# Patient Record
Sex: MALE | Race: WHITE | NOT HISPANIC OR LATINO | Employment: FULL TIME | URBAN - METROPOLITAN AREA
[De-identification: names, ages, dates, MRNs, and addresses within clinical notes are randomized per-mention and may not be internally consistent; named-entity substitution may affect disease eponyms.]

---

## 2023-07-10 ENCOUNTER — HOSPITAL ENCOUNTER (EMERGENCY)
Facility: HOSPITAL | Age: 48
Discharge: HOME | End: 2023-07-11
Attending: EMERGENCY MEDICINE
Payer: COMMERCIAL

## 2023-07-10 DIAGNOSIS — R10.13 EPIGASTRIC ABDOMINAL PAIN: ICD-10-CM

## 2023-07-10 DIAGNOSIS — I48.91 ATRIAL FIBRILLATION WITH RAPID VENTRICULAR RESPONSE: Primary | ICD-10-CM

## 2023-07-10 LAB
ALBUMIN SERPL BCP-MCNC: 4.4 G/DL (ref 3.5–5.2)
ALP SERPL-CCNC: 155 U/L (ref 55–135)
ALT SERPL W/O P-5'-P-CCNC: 165 U/L (ref 10–44)
ANION GAP SERPL CALC-SCNC: 7 MMOL/L (ref 8–16)
AST SERPL-CCNC: 74 U/L (ref 10–40)
BASOPHILS # BLD AUTO: 0.03 K/UL (ref 0–0.2)
BASOPHILS NFR BLD: 0.3 % (ref 0–1.9)
BILIRUB SERPL-MCNC: 0.7 MG/DL (ref 0.1–1)
BILIRUB UR QL STRIP: NEGATIVE
BNP SERPL-MCNC: 37 PG/ML (ref 0–99)
BUN SERPL-MCNC: 10 MG/DL (ref 6–20)
CALCIUM SERPL-MCNC: 9.3 MG/DL (ref 8.7–10.5)
CHLORIDE SERPL-SCNC: 105 MMOL/L (ref 95–110)
CLARITY UR: CLEAR
CO2 SERPL-SCNC: 27 MMOL/L (ref 23–29)
COLOR UR: YELLOW
CREAT SERPL-MCNC: 0.7 MG/DL (ref 0.5–1.4)
DIFFERENTIAL METHOD: ABNORMAL
EOSINOPHIL # BLD AUTO: 0.1 K/UL (ref 0–0.5)
EOSINOPHIL NFR BLD: 0.7 % (ref 0–8)
ERYTHROCYTE [DISTWIDTH] IN BLOOD BY AUTOMATED COUNT: 13.6 % (ref 11.5–14.5)
EST. GFR  (NO RACE VARIABLE): >60 ML/MIN/1.73 M^2
GLUCOSE SERPL-MCNC: 104 MG/DL (ref 70–110)
GLUCOSE UR QL STRIP: NEGATIVE
HCT VFR BLD AUTO: 43.8 % (ref 40–54)
HGB BLD-MCNC: 14.3 G/DL (ref 14–18)
HGB UR QL STRIP: NEGATIVE
IMM GRANULOCYTES # BLD AUTO: 0.03 K/UL (ref 0–0.04)
IMM GRANULOCYTES NFR BLD AUTO: 0.3 % (ref 0–0.5)
KETONES UR QL STRIP: ABNORMAL
LEUKOCYTE ESTERASE UR QL STRIP: NEGATIVE
LIPASE SERPL-CCNC: 46 U/L (ref 4–60)
LYMPHOCYTES # BLD AUTO: 1.5 K/UL (ref 1–4.8)
LYMPHOCYTES NFR BLD: 16.9 % (ref 18–48)
MCH RBC QN AUTO: 30.9 PG (ref 27–31)
MCHC RBC AUTO-ENTMCNC: 32.6 G/DL (ref 32–36)
MCV RBC AUTO: 95 FL (ref 82–98)
MONOCYTES # BLD AUTO: 0.5 K/UL (ref 0.3–1)
MONOCYTES NFR BLD: 5.8 % (ref 4–15)
NEUTROPHILS # BLD AUTO: 6.8 K/UL (ref 1.8–7.7)
NEUTROPHILS NFR BLD: 76 % (ref 38–73)
NITRITE UR QL STRIP: NEGATIVE
NRBC BLD-RTO: 0 /100 WBC
PH UR STRIP: 7 [PH] (ref 5–8)
PLATELET # BLD AUTO: 301 K/UL (ref 150–450)
PMV BLD AUTO: 9.5 FL (ref 9.2–12.9)
POTASSIUM SERPL-SCNC: 3.5 MMOL/L (ref 3.5–5.1)
PROT SERPL-MCNC: 7.8 G/DL (ref 6–8.4)
PROT UR QL STRIP: ABNORMAL
RBC # BLD AUTO: 4.63 M/UL (ref 4.6–6.2)
SODIUM SERPL-SCNC: 139 MMOL/L (ref 136–145)
SP GR UR STRIP: 1.02 (ref 1–1.03)
TROPONIN I SERPL HS-MCNC: 4.2 PG/ML (ref 0–14.9)
URN SPEC COLLECT METH UR: ABNORMAL
UROBILINOGEN UR STRIP-ACNC: NEGATIVE EU/DL
WBC # BLD AUTO: 8.99 K/UL (ref 3.9–12.7)

## 2023-07-10 PROCEDURE — 96374 THER/PROPH/DIAG INJ IV PUSH: CPT | Mod: 59

## 2023-07-10 PROCEDURE — 25000003 PHARM REV CODE 250

## 2023-07-10 PROCEDURE — C9113 INJ PANTOPRAZOLE SODIUM, VIA: HCPCS | Performed by: STUDENT IN AN ORGANIZED HEALTH CARE EDUCATION/TRAINING PROGRAM

## 2023-07-10 PROCEDURE — 25500020 PHARM REV CODE 255: Performed by: EMERGENCY MEDICINE

## 2023-07-10 PROCEDURE — 63600175 PHARM REV CODE 636 W HCPCS: Performed by: STUDENT IN AN ORGANIZED HEALTH CARE EDUCATION/TRAINING PROGRAM

## 2023-07-10 PROCEDURE — 96361 HYDRATE IV INFUSION ADD-ON: CPT

## 2023-07-10 PROCEDURE — 93010 EKG 12-LEAD: ICD-10-PCS | Mod: ,,, | Performed by: INTERNAL MEDICINE

## 2023-07-10 PROCEDURE — 84484 ASSAY OF TROPONIN QUANT: CPT | Performed by: EMERGENCY MEDICINE

## 2023-07-10 PROCEDURE — 36415 COLL VENOUS BLD VENIPUNCTURE: CPT

## 2023-07-10 PROCEDURE — 85025 COMPLETE CBC W/AUTO DIFF WBC: CPT

## 2023-07-10 PROCEDURE — 99285 EMERGENCY DEPT VISIT HI MDM: CPT | Mod: 25

## 2023-07-10 PROCEDURE — 36415 COLL VENOUS BLD VENIPUNCTURE: CPT | Performed by: EMERGENCY MEDICINE

## 2023-07-10 PROCEDURE — 80053 COMPREHEN METABOLIC PANEL: CPT

## 2023-07-10 PROCEDURE — 93005 ELECTROCARDIOGRAM TRACING: CPT | Performed by: INTERNAL MEDICINE

## 2023-07-10 PROCEDURE — 83690 ASSAY OF LIPASE: CPT

## 2023-07-10 PROCEDURE — 81003 URINALYSIS AUTO W/O SCOPE: CPT

## 2023-07-10 PROCEDURE — 83880 ASSAY OF NATRIURETIC PEPTIDE: CPT

## 2023-07-10 PROCEDURE — 93010 ELECTROCARDIOGRAM REPORT: CPT | Mod: ,,, | Performed by: INTERNAL MEDICINE

## 2023-07-10 PROCEDURE — 96375 TX/PRO/DX INJ NEW DRUG ADDON: CPT

## 2023-07-10 RX ORDER — OXYCODONE AND ACETAMINOPHEN 7.5; 325 MG/1; MG/1
1 TABLET ORAL EVERY 4 HOURS PRN
Status: DISCONTINUED | OUTPATIENT
Start: 2023-07-11 | End: 2023-07-11 | Stop reason: HOSPADM

## 2023-07-10 RX ORDER — MAG HYDROX/ALUMINUM HYD/SIMETH 200-200-20
30 SUSPENSION, ORAL (FINAL DOSE FORM) ORAL ONCE
Status: COMPLETED | OUTPATIENT
Start: 2023-07-10 | End: 2023-07-10

## 2023-07-10 RX ORDER — ONDANSETRON 2 MG/ML
4 INJECTION INTRAMUSCULAR; INTRAVENOUS
Status: COMPLETED | OUTPATIENT
Start: 2023-07-10 | End: 2023-07-10

## 2023-07-10 RX ORDER — LIDOCAINE HYDROCHLORIDE 20 MG/ML
15 SOLUTION OROPHARYNGEAL ONCE
Status: COMPLETED | OUTPATIENT
Start: 2023-07-10 | End: 2023-07-10

## 2023-07-10 RX ORDER — PANTOPRAZOLE SODIUM 40 MG/10ML
40 INJECTION, POWDER, LYOPHILIZED, FOR SOLUTION INTRAVENOUS
Status: COMPLETED | OUTPATIENT
Start: 2023-07-10 | End: 2023-07-10

## 2023-07-10 RX ORDER — MORPHINE SULFATE 4 MG/ML
4 INJECTION, SOLUTION INTRAMUSCULAR; INTRAVENOUS
Status: COMPLETED | OUTPATIENT
Start: 2023-07-10 | End: 2023-07-10

## 2023-07-10 RX ADMIN — IOHEXOL 100 ML: 350 INJECTION, SOLUTION INTRAVENOUS at 09:07

## 2023-07-10 RX ADMIN — MORPHINE SULFATE 4 MG: 4 INJECTION, SOLUTION INTRAMUSCULAR; INTRAVENOUS at 10:07

## 2023-07-10 RX ADMIN — SODIUM CHLORIDE, SODIUM LACTATE, POTASSIUM CHLORIDE, AND CALCIUM CHLORIDE 1000 ML: .6; .31; .03; .02 INJECTION, SOLUTION INTRAVENOUS at 10:07

## 2023-07-10 RX ADMIN — LIDOCAINE HYDROCHLORIDE 15 ML: 20 SOLUTION ORAL; TOPICAL at 07:07

## 2023-07-10 RX ADMIN — ALUMINUM HYDROXIDE, MAGNESIUM HYDROXIDE, AND SIMETHICONE 30 ML: 200; 200; 20 SUSPENSION ORAL at 07:07

## 2023-07-10 RX ADMIN — PANTOPRAZOLE SODIUM 40 MG: 40 INJECTION, POWDER, FOR SOLUTION INTRAVENOUS at 07:07

## 2023-07-10 RX ADMIN — ONDANSETRON 4 MG: 2 INJECTION INTRAMUSCULAR; INTRAVENOUS at 10:07

## 2023-07-10 NOTE — FIRST PROVIDER EVALUATION
Medical screening examination initiated.  I have conducted a focused provider triage encounter, findings are as follows:    Brief history of present illness:  Abdominal pain and back pain. SOB.  History of atrial fibrillation.  Taking blood thinners.    There were no vitals filed for this visit.    Pertinent physical exam:  Diffuse abdominal tenderness.  Diffuse lower back pain.    Brief workup plan:  cardiac workup     Preliminary workup initiated; this workup will be continued and followed by the physician or advanced practice provider that is assigned to the patient when roomed.

## 2023-07-11 VITALS
TEMPERATURE: 98 F | HEART RATE: 107 BPM | BODY MASS INDEX: 31.44 KG/M2 | RESPIRATION RATE: 11 BRPM | OXYGEN SATURATION: 96 % | WEIGHT: 245 LBS | DIASTOLIC BLOOD PRESSURE: 98 MMHG | SYSTOLIC BLOOD PRESSURE: 151 MMHG | HEIGHT: 74 IN

## 2023-07-11 PROCEDURE — 25000003 PHARM REV CODE 250: Performed by: STUDENT IN AN ORGANIZED HEALTH CARE EDUCATION/TRAINING PROGRAM

## 2023-07-11 RX ORDER — OXYCODONE HYDROCHLORIDE 5 MG/1
5 TABLET ORAL EVERY 6 HOURS PRN
Qty: 8 TABLET | Refills: 0 | Status: SHIPPED | OUTPATIENT
Start: 2023-07-11 | End: 2023-07-13

## 2023-07-11 RX ORDER — IBUPROFEN 400 MG/1
800 TABLET ORAL
Status: COMPLETED | OUTPATIENT
Start: 2023-07-11 | End: 2023-07-11

## 2023-07-11 RX ORDER — ACETAMINOPHEN 500 MG
1000 TABLET ORAL
Status: COMPLETED | OUTPATIENT
Start: 2023-07-11 | End: 2023-07-11

## 2023-07-11 RX ORDER — METOPROLOL SUCCINATE 25 MG/1
100 TABLET, EXTENDED RELEASE ORAL ONCE
Status: COMPLETED | OUTPATIENT
Start: 2023-07-11 | End: 2023-07-11

## 2023-07-11 RX ORDER — METOPROLOL TARTRATE 1 MG/ML
5 INJECTION, SOLUTION INTRAVENOUS
Status: COMPLETED | OUTPATIENT
Start: 2023-07-11 | End: 2023-07-11

## 2023-07-11 RX ADMIN — METOPROLOL TARTRATE 5 MG: 1 INJECTION, SOLUTION INTRAVENOUS at 12:07

## 2023-07-11 RX ADMIN — METOPROLOL SUCCINATE 100 MG: 25 TABLET, FILM COATED, EXTENDED RELEASE ORAL at 01:07

## 2023-07-11 RX ADMIN — ACETAMINOPHEN 1000 MG: 500 TABLET, FILM COATED ORAL at 02:07

## 2023-07-11 RX ADMIN — IBUPROFEN 800 MG: 400 TABLET, FILM COATED ORAL at 02:07

## 2023-07-11 NOTE — ED PROVIDER NOTES
Encounter Date: 7/10/2023       History     Chief Complaint   Patient presents with    Abdominal Pain     Patient c/o diffuse abdominal pain wit radiation around back. Patient reports hx of ulcers and hiatal hernia. Patient denies pain upon palpation.     HPI Jamie Phelps 48 y.o. male with history of atrial fibrillation anticoagulated on Xarelto and rate control with metoprolol who presented emergency department complaining of epigastric abdominal pain with radiation to back.  Patient states that this pain has been ongoing for 3 days.  He denies nausea, vomiting, fever, chills, chest pain, shortness of breath.    Review of patient's allergies indicates:  No Known Allergies  History reviewed. No pertinent past medical history.  History reviewed. No pertinent surgical history.  History reviewed. No pertinent family history.     Review of Systems   Constitutional:  Negative for chills and fever.   HENT:  Negative for drooling.    Respiratory:  Negative for shortness of breath.    Cardiovascular:  Negative for chest pain.   Gastrointestinal:  Positive for abdominal pain. Negative for nausea and vomiting.   Musculoskeletal:  Negative for gait problem.     Physical Exam     Initial Vitals [07/10/23 1905]   BP Pulse Resp Temp SpO2   (!) 196/107 77 18 98.9 °F (37.2 °C) 99 %      MAP       --         Physical Exam    Nursing note and vitals reviewed.  Constitutional: He appears well-developed and well-nourished.   HENT:   Head: Normocephalic and atraumatic.   Cardiovascular:  Normal rate, regular rhythm, normal heart sounds and intact distal pulses.     Exam reveals no gallop and no friction rub.       No murmur heard.  Pulmonary/Chest: Breath sounds normal. No respiratory distress. He has no wheezes. He has no rhonchi. He has no rales.   Abdominal: Abdomen is soft. He exhibits no distension. There is no abdominal tenderness. There is no rebound and no guarding.     Neurological: He is alert and oriented to person, place,  and time.       ED Course   Procedures  Labs Reviewed   CBC W/ AUTO DIFFERENTIAL - Abnormal; Notable for the following components:       Result Value    Gran % 76.0 (*)     Lymph % 16.9 (*)     All other components within normal limits    Narrative:     For upper or mid abdominal pain.   COMPREHENSIVE METABOLIC PANEL - Abnormal; Notable for the following components:    Alkaline Phosphatase 155 (*)     AST 74 (*)      (*)     Anion Gap 7 (*)     All other components within normal limits    Narrative:     For upper or mid abdominal pain.   URINALYSIS, REFLEX TO URINE CULTURE - Abnormal; Notable for the following components:    Protein, UA Trace (*)     Ketones, UA Trace (*)     All other components within normal limits    Narrative:     In and Out Cath as needed it patient unable to void  Specimen Source->Urine   LIPASE    Narrative:     For upper or mid abdominal pain.   B-TYPE NATRIURETIC PEPTIDE   B-TYPE NATRIURETIC PEPTIDE   TROPONIN I HIGH SENSITIVITY   TROPONIN I HIGH SENSITIVITY     EKG Readings: (Independently Interpreted)   Initial Reading: No STEMI. Rhythm: Normal Sinus Rhythm. Heart Rate: 74. Ectopy: No Ectopy. Conduction: Normal.   Other EKG Interpretations: Repeat EKG done at 10:01 p.m. AFib with RVR rate of 116 no ischemic changes     Imaging Results              CT Abdomen Pelvis With Contrast (Final result)  Result time 07/10/23 22:08:36      Final result by Leonard Brunson MD (07/10/23 22:08:36)                   Narrative:    EXAMINATION: CT ABDOMEN PELVIS WITH IV CONTRAST    COMPARISON: None available.    INDICATION: Diffuse abdominal pain. Epigastric pain. History of hiatal hernia and ulcers.    FINDING(S): During the uneventful dynamic intravenous administration of 100 mL Omnipaque 350 water-soluble contrast, routine spiral 2.0 mm axial tomograms are obtained from the lung bases through the femoral trochanters. Supplemental coronal and sagittal reconstruction imaging is also created. All CT  scans at this facility use dose modulation, iterative reconstruction and/or weight based dosing when appropriate to reduce radiation dose to as low as reasonably achievable.    FINDINGS:  Lung bases: The lung bases are clear. No basilar consolidation or pleural effusion.  Mediastinal base: No acute findings. A 1.6 cm circumscribed cyst is noted along the right heart border at the junction with the IVC.  Liver: No acute findings.  Gallbladder and biliary tract: No acute findings status post cholecystectomy.  Pancreas: A lobulated hyperenhancing pancreatic tail level mass is noted containing large coarse calcifications and some small cystic areas, this measures approximate 3.7 x 4 cm transverse and AP and 3.2 cm cephalocaudal, axial image 65 and coronal image 50. The remaining more distal portion of the pancreas appears normal.  Spleen: No acute findings.  Adrenal glands: No acute findings.  Kidneys, ureters and urinary bladder: No acute findings.  Reproductive organs: No acute findings.  Stomach and duodenum: No acute findings.  Small bowel: No acute findings.  Appendix: A normal-appearing appendix is centered around coronal image 51.  Large bowel: No acute findings.  Lymph nodes: No pathologic lymph node enlargement is identified.  Vasculature: No acute findings.  Peritoneal space: No ascites. No pneumoperitoneum.  Bones/joints: No acute findings.  Body wall: No acute findings.    IMPRESSION:  1. A lobulated solid enhancing pancreatic tail level mass containing calcifications and cystic appearing fluid foci is the dominant finding. The main differential diagnosis would include focal chronic calcific pancreatitis changes, a nonhyperfunctioning neuroendocrine tumor or, less likely, a solid pseudopapillary tumor with the latter much more commonly seen in young females. There is no evidence of any local, regional or distant metastatic disease in the abdomen or pelvis. A calcifying pancreatic adenocarcinoma is considered  unlikely as unless it is an adenocarcinoma forming at the site of chronic calcific pancreatitis.  2. An incidental 1.6 cm simple appearing cystic focus is noted in the right atrial paracardial region.      Electronically signed by:  Leonard Brunson MD  7/10/2023 10:08 PM CDT Workstation: [id number]                                     X-Ray Chest PA And Lateral (In process)                      Medications   oxyCODONE-acetaminophen 7.5-325 mg per tablet 1 tablet (has no administration in time range)   metoprolol succinate (TOPROL-XL) 24 hr tablet 100 mg (has no administration in time range)   aluminum-magnesium hydroxide-simethicone 200-200-20 mg/5 mL suspension 30 mL (30 mLs Oral Given 7/10/23 1922)     And   LIDOcaine HCl 2% oral solution 15 mL (15 mLs Oral Given 7/10/23 1922)   pantoprazole injection 40 mg (40 mg Intravenous Given 7/10/23 1940)   iohexoL (OMNIPAQUE 350) injection 100 mL (100 mLs Intravenous Given 7/10/23 2117)   lactated ringers bolus 1,000 mL (0 mLs Intravenous Stopped 7/10/23 2335)   morphine injection 4 mg (4 mg Intravenous Given 7/10/23 2230)   ondansetron injection 4 mg (4 mg Intravenous Given 7/10/23 2230)   metoprolol injection 5 mg (5 mg Intravenous Given 7/11/23 0022)     Medical Decision Making:   Independently Interpreted Test(s):   I have ordered and independently interpreted EKG Reading(s) - see prior notes  Clinical Tests:   Lab Tests: Ordered and Reviewed  The following lab test(s) were unremarkable: CBC       <> Summary of Lab: Alk-phos 155 AST 74   Urine with trace protein trace ketones  Lipase 46 BNP 37  Radiological Study: Ordered and Reviewed  Medical Tests: Ordered and Reviewed  Attending Note:  I provided a face to face evaluation of this patient.  I discussed the patient's care with the Resident.  I reviewed their note and agree with the history, physical, assessment, diagnosis, treatment, all procedures performed, xray and EKG interpretations and discharge plan  provided by the Resident. My overall impression is LUQ and epigastric pain, pancreatic mass and transaminitis.  Patient's abdominal pain improved after GI cocktail.  CT scan reveals a new pancreatic mass that is undifferentiated he does have a GI appointment tomorrow and this will be further evaluated bed we are working to get a CT disc for him since he was seeing someone outside of the [facility] system.  He was already being seen for the transaminitis.  The patient has been instructed to follow up with their physician or the one provided as well as specific return precautions.   Ara Lopez M.D. 7/10/2023 7:52 PM               ED Course as of 07/11/23 0045   Mon Jul 10, 2023   2220 Troponin I High Sensitivity [MT]   2308 CT Abdomen Pelvis With Contrast     IMPRESSION:  1. A lobulated solid enhancing pancreatic tail level mass containing calcifications and cystic appearing fluid foci is the dominant finding. The main differential diagnosis would include focal chronic calcific pancreatitis changes, a nonhyperfunctioning neuroendocrine tumor or, less likely, a solid pseudopapillary tumor with the latter much more commonly seen in young females. There is no evidence of any local, regional or distant metastatic disease in the abdomen or pelvis. A calcifying pancreatic adenocarcinoma is considered unlikely as unless it is an adenocarcinoma forming at the site of chronic calcific pancreatitis.  2. An incidental 1.6 cm simple appearing cystic focus is noted in the right atrial paracardial region.        Electronically signed by:  Leonard Brunson MD  7/10/2023 10:08 PM CDT Workstation: [id number]        Specimen Collected: 07/10/23 20:58 Last Resulted: 07/10/23 22:08         [MT]   Tue Jul 11, 2023   0043 Patient is a 48-year-old gentleman who presents emergency department complaining of epigastric abdominal pain.  Patient states ongoing for the last 3 days.  He is hemodynamically stable, clinically well-appearing,  afebrile.  He notes that he has a history of elevated liver enzymes and has a follow-up appointment with GI in [facility] at 11:00 a.m. on the morning of July 11, 2023.    Lab work and imaging obtained.  CBC unremarkable.  UA negative.  Lipase within normal limits.  CMP demonstrated elevations in alk-phos, AST, ALT.  Troponin within normal limits at 4.2.  BNP within normal limits at 37.  Chest x-ray per my independent interpretation with trachea midline and patent.  No bony abnormalities.  No pleural effusions.  Cardiac silhouette is within normal limits.  No pleural effusions or focal consolidations.    CT abdomen and pelvis demonstrates pancreatic mass.  Discuss results with patient at bedside.  Patient has a follow-up appointment with GI in the morning.  Patient receiving IV fluids and pain medication.  Reassessment pending.  Anticipate discharge home. [MT]      ED Course User Index  [MT] Elda Zaidi MD                 Clinical Impression:   Final diagnoses:  [R10.13] Epigastric abdominal pain  [I48.91] Atrial fibrillation with rapid ventricular response (Primary)               Elda Zaidi MD  Resident  07/11/23 0046

## 2023-11-24 ENCOUNTER — PATIENT MESSAGE (OUTPATIENT)
Dept: HEMATOLOGY/ONCOLOGY | Facility: CLINIC | Age: 48
End: 2023-11-24
Payer: COMMERCIAL

## 2023-11-24 ENCOUNTER — TELEPHONE (OUTPATIENT)
Dept: HEMATOLOGY/ONCOLOGY | Facility: CLINIC | Age: 48
End: 2023-11-24
Payer: COMMERCIAL

## 2023-11-24 NOTE — NURSING
Spoke with patient and scheduled appointment for 11/28/2023 with Dr. Restrepo; Provided patient with appointment time and date, address of [facility] facility and direct line to navigator. All questions and concerns addressed.

## 2023-11-28 ENCOUNTER — OFFICE VISIT (OUTPATIENT)
Dept: SURGERY | Facility: CLINIC | Age: 48
End: 2023-11-28
Payer: COMMERCIAL

## 2023-11-28 ENCOUNTER — LAB VISIT (OUTPATIENT)
Dept: LAB | Facility: HOSPITAL | Age: 48
End: 2023-11-28
Attending: SURGERY
Payer: COMMERCIAL

## 2023-11-28 VITALS
DIASTOLIC BLOOD PRESSURE: 78 MMHG | BODY MASS INDEX: 31.55 KG/M2 | WEIGHT: 245.81 LBS | OXYGEN SATURATION: 100 % | HEART RATE: 78 BPM | SYSTOLIC BLOOD PRESSURE: 133 MMHG | HEIGHT: 74 IN

## 2023-11-28 DIAGNOSIS — D3A.8 PRIMARY PANCREATIC NEUROENDOCRINE TUMOR: Primary | ICD-10-CM

## 2023-11-28 DIAGNOSIS — D3A.8 PRIMARY PANCREATIC NEUROENDOCRINE TUMOR: ICD-10-CM

## 2023-11-28 DIAGNOSIS — I48.0 PAROXYSMAL ATRIAL FIBRILLATION: ICD-10-CM

## 2023-11-28 PROBLEM — I48.91 ATRIAL FIBRILLATION: Status: ACTIVE | Noted: 2023-11-28

## 2023-11-28 LAB
ALBUMIN SERPL BCP-MCNC: 3.6 G/DL (ref 3.5–5.2)
ALP SERPL-CCNC: 226 U/L (ref 55–135)
ALT SERPL W/O P-5'-P-CCNC: 141 U/L (ref 10–44)
ANION GAP SERPL CALC-SCNC: 11 MMOL/L (ref 8–16)
AST SERPL-CCNC: 55 U/L (ref 10–40)
BILIRUB SERPL-MCNC: 0.3 MG/DL (ref 0.1–1)
BUN SERPL-MCNC: 9 MG/DL (ref 6–20)
CA-I BLDV-SCNC: 1.36 MMOL/L (ref 1.06–1.42)
CALCIUM SERPL-MCNC: 9.9 MG/DL (ref 8.7–10.5)
CHLORIDE SERPL-SCNC: 105 MMOL/L (ref 95–110)
CO2 SERPL-SCNC: 26 MMOL/L (ref 23–29)
CREAT SERPL-MCNC: 0.7 MG/DL (ref 0.5–1.4)
EST. GFR  (NO RACE VARIABLE): >60 ML/MIN/1.73 M^2
ESTIMATED AVG GLUCOSE: 100 MG/DL (ref 68–131)
GLUCOSE SERPL-MCNC: 83 MG/DL (ref 70–110)
HBA1C MFR BLD: 5.1 % (ref 4–5.6)
POTASSIUM SERPL-SCNC: 4.3 MMOL/L (ref 3.5–5.1)
PROT SERPL-MCNC: 7.2 G/DL (ref 6–8.4)
SODIUM SERPL-SCNC: 142 MMOL/L (ref 136–145)

## 2023-11-28 PROCEDURE — 83519 RIA NONANTIBODY: CPT | Performed by: SURGERY

## 2023-11-28 PROCEDURE — 82330 ASSAY OF CALCIUM: CPT | Performed by: SURGERY

## 2023-11-28 PROCEDURE — 82941 ASSAY OF GASTRIN: CPT | Performed by: SURGERY

## 2023-11-28 PROCEDURE — 82943 ASSAY OF GLUCAGON: CPT | Performed by: SURGERY

## 2023-11-28 PROCEDURE — 84586 ASSAY OF VIP: CPT | Performed by: SURGERY

## 2023-11-28 PROCEDURE — 36415 COLL VENOUS BLD VENIPUNCTURE: CPT | Performed by: SURGERY

## 2023-11-28 PROCEDURE — 83036 HEMOGLOBIN GLYCOSYLATED A1C: CPT | Performed by: SURGERY

## 2023-11-28 PROCEDURE — 99205 OFFICE O/P NEW HI 60 MIN: CPT | Mod: S$GLB,,, | Performed by: SURGERY

## 2023-11-28 PROCEDURE — 99205 PR OFFICE/OUTPT VISIT, NEW, LEVL V, 60-74 MIN: ICD-10-PCS | Mod: S$GLB,,, | Performed by: SURGERY

## 2023-11-28 PROCEDURE — 80053 COMPREHEN METABOLIC PANEL: CPT | Performed by: SURGERY

## 2023-11-28 PROCEDURE — 99999 PR PBB SHADOW E&M-EST. PATIENT-LVL III: ICD-10-PCS | Mod: PBBFAC,,, | Performed by: SURGERY

## 2023-11-28 PROCEDURE — 99213 OFFICE O/P EST LOW 20 MIN: CPT | Mod: PBBFAC | Performed by: SURGERY

## 2023-11-28 PROCEDURE — 99999 PR PBB SHADOW E&M-EST. PATIENT-LVL III: CPT | Mod: PBBFAC,,, | Performed by: SURGERY

## 2023-11-28 RX ORDER — RIVAROXABAN 20 MG/1
20 TABLET, FILM COATED ORAL
COMMUNITY

## 2023-11-28 RX ORDER — FAMOTIDINE 10 MG/1
10 TABLET ORAL
COMMUNITY

## 2023-11-28 RX ORDER — METOPROLOL SUCCINATE 100 MG/1
100 TABLET, EXTENDED RELEASE ORAL
COMMUNITY
Start: 2022-08-01

## 2023-11-28 NOTE — PROGRESS NOTES
SURGICAL ONCOLOGY CLINIC H&P    Subjective:     Jamie Phelps is a 48 y.o. 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 Patient describes episodes of epigastric pain that radiates to his back on and off over the last 18 years. His first episode was in 2005 with severe epigastric/back pain that caused him to go to the ED for which he received a GI cocktail and pain control and he improved. He describes several of these episodes throughout the years all of which have resolved without intervention. This July, he was driving home from travelling for work and had to stop in [place] at the hospital because he was having another bout and could not make it home due to the pain. At that time, they did a CT scan which revealed a lesion in the tail of the pancreas. He had an appointment the following day with GI and ended up being discharged home and followed up with GI, who eventually referred him to [facility] for EUS and biopsy of this lesion in 9/2023. Pathology was consistent with well-differentiated pancreatic NET.      PMH: Atrial fibrillation    Past Surgical History: Laparoscopic cholecystectomy    Social History:  Social History     Socioeconomic History    Marital status:    Tobacco Use    Smoking status: Former     Types: Cigarettes    Smokeless tobacco: Never    Tobacco comments:     Quit may 2023       Family History:No family history on file.    Allergies:   Review of patient's allergies indicates:   Allergen Reactions    Lisinopril Anaphylaxis, Shortness Of Breath and Swelling       Medications:  Current Outpatient Medications on File Prior to Visit   Medication Sig Dispense Refill    famotidine (PEPCID) 10 MG tablet Take 10 mg by mouth. PRN      losartan (COZAAR) 12.5 MG tablet       metoprolol succinate (TOPROL-XL) 100 MG 24 hr tablet Take 100 mg by mouth.      XARELTO 20 mg Tab Take 20 mg by mouth.       No current  facility-administered medications on file prior to visit.         Objective:     Vital Signs (Most Recent)  Pulse: 78 (11/28/23 1259)  BP: 133/78 (11/28/23 1259)  SpO2: 100 % (11/28/23 1259)    ROS A 10+ review of systems was performed with pertinent positives and negatives noted above in the history of present illness.  Other systems were negative unless otherwise specified.    Physical Exam  Vitals and nursing note reviewed.   Constitutional:       Appearance: Normal appearance.   HENT:      Head: Normocephalic and atraumatic.   Cardiovascular:      Rate and Rhythm: Normal rate.   Pulmonary:      Effort: Pulmonary effort is normal.   Abdominal:      General: Abdomen is flat.      Palpations: Abdomen is soft.   Skin:     General: Skin is warm and dry.      Capillary Refill: Capillary refill takes less than 2 seconds.   Neurological:      General: No focal deficit present.      Mental Status: He is alert and oriented to person, place, and time.   Psychiatric:         Mood and Affect: Mood normal.         Behavior: Behavior normal.          Imaging  The following imaging was reviewed:     CT Abd/Pelvis 7/10/23:  IMPRESSION:  1. A lobulated solid enhancing pancreatic tail level mass containing calcifications and cystic appearing fluid foci is the dominant finding. The main differential diagnosis would include focal chronic calcific pancreatitis changes, a nonhyperfunctioning neuroendocrine tumor or, less likely, a solid pseudopapillary tumor with the latter much more commonly seen in young females. There is no evidence of any local, regional or distant metastatic disease in the abdomen or pelvis. A calcifying pancreatic adenocarcinoma is considered unlikely as unless it is an adenocarcinoma forming at the site of chronic calcific pancreatitis.  2. An incidental 1.6 cm simple appearing cystic focus is noted in the right atrial paracardial region.       NM PET 11/3/23:  4.3 x 3.4 x 3.2 cm isoattenuating mass in   the  pancreatic tail with multiple dystrophic calcifications, likely   reflecting patient's known pancreatic neuroendocrine tumor.     There is a 1.4 cm left periaortic node demonstrating marked radiotracer   uptake on fused image 210 with an SUV max of 67.  An additional 5 mm   mesenteric node adjacent to the the mesenteric root on fused image 208   demonstrates an SUV max of 21.  A 5 mm nodule adjacent to the   pancreatic tail demonstrates an SUV max of 7 on fused image 191       Other Diagnostic Studies:  Pathology: well-differentiated neuroendocrine tumor     Assessment:     Jamie Phelps is a 48 y.o. male with PMH of a-fib on xarelto presenting to clinic today for newly diagnosed pancreatic tail NET.    Plan:         I have seen the patient, reviewed the attached resident or SCOTT's history and physical, assessment and plan. I have personally interviewed and examined the patient at bedside, reviewed the chart, relevant imaging/labs and agree with the findings.     11/28/2023 P/W pancreatitis/pain, Left pancreas mass, EUS bx= well-diff NET (K-67 approx 4%)  -Cu64 Dota showed left paraaortic and mesenteric root node, liver is heterogeneous    Rec:  -biochemical testing  -MRI liver  -present at [facility], he has multifocal dz, if functional we should start with octreotide, if not would probably tend toward surgery first given symptomatology.  His distribution of disease will be a little challenging.    I spent >60 minutes of total time on the encounter, which includes face to face time and non-face to face time preparing to see the patient (e.g., review of tests), obtaining and/or reviewing separately obtained history, documenting clinical information in the electronic or other health record, independently interpreting results (not separately reported) and communicating results to the patient/family/caregiver, or care coordination (not separately reported).          Andre Restrepo MD, FACS  Surgical Oncology  [facility]  [place], [place]  Office: [phone]  Fax: [phone]

## 2023-11-30 LAB — GASTRIN SERPL-MCNC: 11 PG/ML

## 2023-12-06 LAB — PANC POLYPEPT SERPL-MCNC: 234 PG/ML

## 2023-12-07 ENCOUNTER — TUMOR BOARD CONFERENCE (OUTPATIENT)
Dept: HEMATOLOGY/ONCOLOGY | Facility: CLINIC | Age: 48
End: 2023-12-07
Payer: COMMERCIAL

## 2023-12-07 NOTE — PROGRESS NOTES
[facility]      NEUROENDOCRINE MULTIDISCIPLINARY TUMOR BOARD  _____________________________________________________________________    PRESENTER:   Lucas Davidson MD    REASON FOR PRESENTATION:  Scan Review    ATTENDEES:   Gastroenterology - Not Present  Interventional Radiology - Lawrence Biggs MD and MARIA ESTHER Henriquez  Nuclear Medicine - Brett Rivers MD  Nursing - [facility] Nursing: Alison Soliman, RN and Yoli Dodson RN  Oncology - Lucas Davidson MD and Justin Dumont MD  Palliative Medicine - Not Present  Pathology -  Sarahi Oliva MD and Garland Sotelo MD  Research - Not Present  Surgery - MD Edgard Davis MD Jacob Dowden, MD Nathan Bolton, MD    PATIENT STATUS:  Established Patient    PATIENT SUMMARY:  No past medical history on file.    No past surgical history on file.    TUMOR MARKERS:  5-HIAA, Plasma Ref Range: up to 22 ng/mL      Chromogranin A Ref Range: <93 ng/mL      Gastrin Ref Range: <100 pg/mL  Recent Labs   Lab 11/28/23  1445   Gastrin 11     Neurokinin A Ref Range: 0 - 40 pg/mL      Pancreastatin Ref Range: 10 - 135 pg/mL      Pancreatic Polypeptide Ref Range: >314 pg/mL  Recent Labs   Lab 11/28/23  1445   Pancreatic Polypeptide 234     Serotonin Ref Range: <=230 ng/mL            Latest Ref Rng & Units 11/28/2023     2:45 PM 11/28/2023    12:59 PM 7/10/2023     7:40 PM   Neuroendocrine Labs   GASTRIN <100 pg/mL 11  C       CORRECTED RESULT; previously reported as 925 on 11/30/2023 at 10:19.     PANCREATIC POLYPEPTIDE <270 pg/mL 234        -------------------ADDITIONAL INFORMATION-------------------  This test was developed and its performance characteristics   determined by [facility] in a manner consistent with CLIA   requirements. This test has not been cleared or approved by   the U.S. Food and Drug Administration.    Test Performed by:  [facility]  [address] [place] [address]  : Garland Cui  M.D. Ph.D.; [facility]# [id number]       WBC 3.90 - 12.70 K/uL   8.99    RBC 4.60 - 6.20 M/uL   4.63    HGB 14.0 - 18.0 g/dL   14.3    HCT 40.0 - 54.0 %   43.8    MCV 82 - 98 fL   95    MCH 27.0 - 31.0 pg   30.9    MCHC 32.0 - 36.0 g/dL   32.6    RDW 11.5 - 14.5 %   13.6    PLATLETS 150 - 450 K/uL   301    MPV 9.2 - 12.9 fL   9.5    GRAN # 1.8 - 7.7 K/uL   6.8    LYMPH # 1.0 - 4.8 K/uL   1.5    MONO # 0.3 - 1.0 K/uL   0.5    EOS # 0.0 - 0.5 K/uL   0.1    BASO # 0.00 - 0.20 K/uL   0.03    GRAN % 38.0 - 73.0 %   76.0    LYMPH % 18.0 - 48.0 %   16.9    MONO % 4.0 - 15.0 %   5.8    EOS % 0.0 - 8.0 %   0.7    BASO % 0.0 - 1.9 %   0.3    DIFF METHOD    Automated    GLUCOSE 70 - 110 mg/dL 83   104    BUN 6 - 20 mg/dL 9   10    CREATININE 0.5 - 1.4 mg/dL 0.7   0.7     - 145 mmol/L 142   139    K 3.5 - 5.1 mmol/L 4.3   3.5    CHLORIDE 95 - 110 mmol/L 105   105    CO2 23 - 29 mmol/L 26   27    CALCIUM 8.7 - 10.5 mg/dL 9.9   9.3    PROTEIN, TOTAL 6.0 - 8.4 g/dL 7.2   7.8    ALBUMIN 3.5 - 5.2 g/dL 3.6   4.4    TOTAL BILIRUBIN 0.1 - 1.0 mg/dL 0.3        For infants and newborns, interpretation of results should be based  on gestational age, weight and in agreement with clinical  observations.    Premature Infant recommended reference ranges:  Up to 24 hours.............<8.0 mg/dL  Up to 48 hours............<12.0 mg/dL  3-5 days..................<15.0 mg/dL  6-29 days.................<15.0 mg/dL    0.7        For infants and newborns, interpretation of results should be based  on gestational age, weight and in agreement with clinical  observations.    Premature Infant recommended reference ranges:  Up to 24 hours.............<8.0 mg/dL  Up to 48 hours............<12.0 mg/dL  3-5 days..................<15.0 mg/dL  6-29 days.................<15.0 mg/dL     ALK PHOSPHATASE 55 - 135 U/L 226   155    SGOT (AST) 10 - 40 U/L 55   74    SGPT (ALT) 10 - 44 U/L 141   165    HEMOGLOBIN A1C 4.0 - 5.6 % 5.1        ADA Screening  Guidelines:  5.7-6.4%  Consistent with prediabetes  >or=6.5%  Consistent with diabetes    High levels of fetal hemoglobin interfere with the HbA1C  assay. Heterozygous hemoglobin variants (HbS, HgC, etc)do  not significantly interfere with this assay.   However, presence of multiple variants may affect accuracy.       Weight   245 lbs 13 oz       C Corrected result     ____________________________________________________________________  DISCUSSION:b48 y.o. male pancreatic tail NET. epigastric pain that radiates to his back on and off over the last 18 years. His first episode was in 2005. This July had CT scan which revealed a lesion in the tail of the pancreas.   EUS and biopsy of this lesion in 9/2023. Pathology was consistent with well-differentiated pancreatic NET.    INT RAD: CT demonstrates pancreatic tail lesion measuring 4.5 cm. There are lymph nodes adjacent to stomach and RP that are c/f metastatic disease. Subcm enhancing lesions in S6 and S7 c/f metastatic disease but too small to characterize. Defer PET to Dr. Rivers.     NUC MED: Tracer avid pancreatic tail mass consistent with NET. Additional tracer avid peripancreatic nodes, a mesenteric node, and a left para-aortic node consistent with mets.     BOARD RECOMMENDATIONS:  MRI of liver.

## 2023-12-12 LAB — GLUCAGON SERPL-MCNC: 1511 PG/ML

## 2023-12-18 DIAGNOSIS — D3A.8 PRIMARY PANCREATIC NEUROENDOCRINE TUMOR: Primary | ICD-10-CM

## 2023-12-19 ENCOUNTER — PATIENT MESSAGE (OUTPATIENT)
Dept: HEMATOLOGY/ONCOLOGY | Facility: CLINIC | Age: 48
End: 2023-12-19
Payer: COMMERCIAL

## 2024-01-02 LAB — MAYO MISCELLANEOUS RESULT (REF): NORMAL

## 2024-01-03 ENCOUNTER — TELEPHONE (OUTPATIENT)
Dept: SURGERY | Facility: CLINIC | Age: 49
End: 2024-01-03

## 2024-01-17 ENCOUNTER — PATIENT MESSAGE (OUTPATIENT)
Dept: HEMATOLOGY/ONCOLOGY | Facility: CLINIC | Age: 49
End: 2024-01-17

## 2024-01-31 ENCOUNTER — TELEPHONE (OUTPATIENT)
Dept: HEMATOLOGY/ONCOLOGY | Facility: CLINIC | Age: 49
End: 2024-01-31

## 2024-01-31 ENCOUNTER — HOSPITAL ENCOUNTER (OUTPATIENT)
Dept: RADIOLOGY | Facility: HOSPITAL | Age: 49
Discharge: HOME | End: 2024-01-31
Attending: SURGERY
Payer: COMMERCIAL

## 2024-01-31 ENCOUNTER — OFFICE VISIT (OUTPATIENT)
Dept: HEMATOLOGY/ONCOLOGY | Facility: CLINIC | Age: 49
End: 2024-01-31
Payer: COMMERCIAL

## 2024-01-31 DIAGNOSIS — D3A.8 NEUROENDOCRINE TUMOR OF PANCREAS: ICD-10-CM

## 2024-01-31 DIAGNOSIS — Z71.83 ENCOUNTER FOR NONPROCREATIVE GENETIC COUNSELING: Primary | ICD-10-CM

## 2024-01-31 DIAGNOSIS — D3A.8 PRIMARY PANCREATIC NEUROENDOCRINE TUMOR: ICD-10-CM

## 2024-01-31 PROCEDURE — 99204 OFFICE O/P NEW MOD 45 MIN: CPT | Mod: S$GLB,,, | Performed by: NURSE PRACTITIONER

## 2024-01-31 PROCEDURE — 99999 PR PBB SHADOW E&M-EST. PATIENT-LVL III: CPT | Mod: PBBFAC,,, | Performed by: NURSE PRACTITIONER

## 2024-01-31 PROCEDURE — 74183 MRI ABD W/O CNTR FLWD CNTR: CPT | Mod: 26,,, | Performed by: RADIOLOGY

## 2024-01-31 PROCEDURE — 25500020 PHARM REV CODE 255: Performed by: SURGERY

## 2024-01-31 PROCEDURE — 74183 MRI ABD W/O CNTR FLWD CNTR: CPT | Mod: TC

## 2024-01-31 PROCEDURE — A9585 GADOBUTROL INJECTION: HCPCS | Performed by: SURGERY

## 2024-01-31 RX ORDER — GADOBUTROL 604.72 MG/ML
10 INJECTION INTRAVENOUS
Status: COMPLETED | OUTPATIENT
Start: 2024-01-31 | End: 2024-01-31

## 2024-01-31 RX ADMIN — GADOBUTROL 10 ML: 604.72 INJECTION INTRAVENOUS at 08:01

## 2024-01-31 NOTE — Clinical Note
Please contact pt to schedule in-person, 30-min post-test visit with me for around 2/28/24 (or whenever best works for him).

## 2024-01-31 NOTE — TELEPHONE ENCOUNTER
Called patient to schedule follow up appointment with Jay Leblanc DNP around 2/28/24. No answer.

## 2024-01-31 NOTE — TELEPHONE ENCOUNTER
Called wife back said they would like to wait until 9 am to see if  is done with the MRI. She will call back and let me know if they can make the appointment today. ----- Message from Obdulia Dominique sent at 1/31/2024  8:05 AM CST -----  Type:  Patient Returning Call    Who Called:pt wife   Who Left Message for Patient:  Does the patient know what this is regarding?:late pt  Would the patient rather a call back or a response via [facility]? Call   Best Call Back Number:[phone]  Additional Information: was told it will take 40 mis for MRI and pt hasn't taken it yet. States they will be late for appt but would like to be seen today

## 2024-01-31 NOTE — PROGRESS NOTES
"Cancer Genetics  Department of Hematology and Oncology  [facility]  [facility]    Date of Service:  24  Visit Provider:  Jay Leblanc DNP  Collaborating Physician:  Hallie Tabares MD    Patient ID  Name: Jamie Phelps    : 1975    MRN: [id number]      Referring Provider  Andre Restrepo MD  [address] [place] [address]    SUBJECTIVE      Chief Complaint: Genetic Evaluation    History of Present Illness (HPI):  Jamie Phelps ("Jamie"), 48 y.o., assigned male sex at birth, is new to the [facility] Department of [facility] and to me.  He was referred by Dr. Andre Restrepo ([facility]) for cancer-genetic risk assessment given his diagnosis of a pancreatic neuroendocrine tumor (NET).    Focused Medical History  Genetic testing:  No  Pancreatic neuroendocrine tumor (NET), dx.age 48  07/10/2023 (age 48):  CT AP with pancreatic tail-level mass  2023 (age 48):  EUS-guided biopsy of mass of pancreas tail, with pathology indicating well differentiated neuroendocrine tuor (NET)  2023 (age 48):  PET CT with 1.4-cm left periaortic node and a few 5-mm peripancreatic and mesenteric nodes, compatible with moni disease  2024 (age 48):  MRI abdomen (today) (per patient, checking spot on liver)  Has received "2 shots" of systemic therapy thus far  Prior history:  Never diagnosed with pancreatitis; however, started with GI symptoms in   Colon polyp:  No - History of 1 colonoscopy, per patient  Other cancer, tumor, or pertinent mass/lesion:  No  Blood disorder:  No    Past Medical History:   Diagnosis Date    Gallstones      Patient Active Problem List    Diagnosis Date Noted    Primary pancreatic neuroendocrine tumor 2023    Atrial fibrillation 2023     Ancestry  Ashkenazi Caodaism:  None known    Family Oncologic History  Consanguinity:  No  Hereditary cancer genetic testing in blood " "relatives:  No  Other than noted, no known history of cancer in relatives depicted in the pedigree or in:  maternal first cousins/first half-cousins, maternal extended family, paternal extended family.  Other than noted, no known family history of pancreatitis, benign tumor/mass/lesion, or colon polyps.  Unaware of or limited knowledge of medical history of:  paternal first cousins.    ONCOLOGY PEDIGREE  ** If this pedigree appears small/illegible on your screen, expand this note window horizontally. **      Review of Systems  See HPI.    Patient's Distress Score today was 5/10 (scale of 0-10 on which 10=worst).  Patient attributes this to health.  Patient denies experiencing thoughts of harming self or others.  Offered patient a referral to Behavioral Health, and patient declined.      OBJECTIVE     Physical Exam  Pleasant patient.  Accompanied by his wife, Yuly, who is also pleasant.  Vitals signs:  Reviewed:  Vitals:    01/31/24 0901   PainSc: 0-No pain   (Pain is rated on scale of 0-10 on which 10=worst.)  Constitutional      Appearance:  Appears well developed and well nourished. No distress.   Pulmonary     Effort:  Normal.  Neurological     Mental Status:  Alert and oriented.     Coordination:  Normal.   Psychiatric         Mood and Affect:  Normal.     Thought Content:  Normal.     Speech:  Normal.     Behavior:  Normal.     Judgment:  Normal.  Genetics-specific     It is my assessment that the patient is ready to proceed with cancer genetic testing from a psychosocial perspective.    ASSESSMENT / PLAN      Jamie Phelps ("Jamie"), 48 y.o., assigned male sex at birth, is new to the [facility] and to me.  He was referred by Dr. Andre Restrepo ([facility]) for cancer-genetic risk assessment given his diagnosis of a pancreatic neuroendocrine tumor (NET).      Cancer-genetic risk assessment and pre-test cancer genetic counseling were conducted.            " ICD-10-CM ICD-9-CM   1. Encounter for nonprocreative genetic counseling  Z71.83 V26.33   2. Neuroendocrine tumor of pancreas  D3A.8 209.69     1. Encounter for nonprocreative genetic counseling    From a clinical standpoint, regarding hereditary cancer susceptibility gene testing:  Jamie meets current National Comprehensive Cancer Network (NCCN) criteria for consideration of such testing based on his diagnosis of a pancreatic neuroendocrine tumor.    His family history is not overtly suggestive of a hereditary cancer syndrome.    Cancer Genetics:  Germline cancer genetic testing is the testing of genes associated with cancer, known as cancer susceptibility genes.  Just as these genes are inherited from parents--one copy of each gene from each parent--mutations in these genes can be inherited, as well.  A mutation in a cancer susceptibility gene adversely affects the gene's ability to prevent cancer; therefore, carriers of cancer susceptibility gene mutations may be at increased risk for certain cancers.     Causes of Cancer:  Only approximately 5%-10% of cancers are caused by an inherited cancer susceptibility gene mutation; rather, the majority of cancers are sporadic.  Causes of sporadic cancers may include environmental risk factors, lifestyle risk factors, and non-modifiable risk factors.  It is important to note that members of a family often share not only their genetics but also other risk factors, including environmental and lifestyle risk factors, so cancers can be familial.     Potential Results of Genetic Testing, and Their Implications:  Potential results of genetic testing include positive, negative, and variant of unknown significance (VUS).    Positive:  A positive result indicates the presence of at least one clinically significant gene mutation, and the individual's associated cancer risks vary depending upon the cancer susceptibility gene(s) in which there is/are a mutation(s).  With a positive  result, depending upon the specific result and the individual's clinical history, modified risk management may be recommended, including measures for risk reduction and/or surveillance; however, there are not always effective strategies for modified risk management.  Negative:  A negative result indicates that no clinically significant mutations were identified in the gene(s) tested.    VUS:  A VUS indicates that there is not presently enough data for the laboratory to make a determination as to whether the genetic variant is clinically significant.  VUSs are not typically acted upon clinically.       Genetic Mutation Inheritance:  When an individual tests positive for a gene mutation, his first-degree relatives each have a 50% chance of carrying the same mutation, and other, more distant blood relatives can also be at risk of carrying the same mutation.      Genetic Discrimination:  Genetic discrimination occurs when individuals are discriminated against on the basis of their genetic information.  The Genetic Information Nondiscrimination Act of 2008 (NAINA) is U.[place]. federal legislation that provides some protections against use of an individual's genetic information by their health insurer and by their employer.  Title I of NAINA prohibits most health insurers from utilizing an individual's genetic information to make decisions regarding insurance eligibility or premium charges; this protection does not apply to health insurance obtained through a job with the , and it may not apply to health insurance obtained through the Federal Employees Health Benefits Plan.  Title II of NAINA prohibits covered entities, in many cases, from requesting or requiring the genetic information of employees and applicants and from using said information to make employment decisions; this protection does not apply to employers with fewer than 15 employees or to the .  NAINA does not protect individuals from genetic  discrimination by any other type of policy or entity, including but not limited to life insurance, disability insurance, long-term care insurance,  benefits, and [facility] benefits.    Genetic Testing Logistics:  An outside laboratory would perform the testing after a blood sample is collected at [facility].  One can expect this genetic testing to take approximately three weeks to result.  There is a potential for the patient to incur out-of-pocket costs related to genetic testing.  Post-test genetic counseling can be conducted once the genetic testing results are available.     Plan:  Offered Jamie options of proceeding with hereditary cancer susceptibility gene testing at this time versus delaying/declining such.  Jamie desires to proceed with such testing at this time.  Provided germline cancer genetic test options of focused panel versus broad panel, and Jamie opts for the broad option and specifically agrees to proceed with [facility] 91-gene panel.  Jamie was made aware that neither all colon cancer susceptibility genes nor all melanoma susceptibility genes will be included as not offered by [facility].    Genetic Test Information:  Testing lab: [facility]   Test panel: [facility] Order Number: [id number]     CustomNext: Cancer® +DIATEM Networksnsight® (9510-R)  Test Options: Others: MEN1, CDKN1B, NF1, TSC1, TSC2, VHL  Selected Genes: AIP, ALK, APC, TIMOTEO, AXIN2, BAP1, BARD1, BLM, BMPR1A, BRCA1, BRCA2, BRIP1, CASR, CDC73, CDH1, CDK4, CDKN1B, CDKN2A, CFTR, CHEK2, CPA1, CTNNA1, CTRC, DICER1, EGFR, EGLN1, EPCAM, ONV225N, FANCC, FH, FLCN, GALNT12, GREM1, HOXB13, KIF1B, KIT, LZTR1, MAX, MEN1, MET, MITF, MLH1, MLH3, MRE11A, MSH2, MSH3, MSH6, MUTYH, NBN, NF1, NF2, NTHL1, PALB2, PALLD, PDGFRA, PHOX2B, PMS2, POLD1, POLE, POT1, UNKZC1E, PRSS1, PTCH1, PTEN, RAD50, RAD51C, RAD51D, RB1, RECQL, RET, RINT1, RPS20, SDHA, SDHAF2, SDHB, SDHC, SDHD, SMAD4, SMARCA4, SMARCB1, SMARCE1, SPINK1, STK11, SUFU, TERT, GYUZ833, TP53, TSC1, TSC2, VHL,  XRCC2    Requested as STAT    ICD-10 code(s): D3A.8   Verbal informed consent: Obtained   Specimen type: Blood  (Patient denies blood disorders that would necessitate a skin fibroblast specimen)   Specimen collection by: [facility]   Specimen collection date: 01/31/2024    Results expected by: Approximately 2-3 weeks after the genetic testing lab receives the specimen   Post-test genetic counseling: ~4 weeks after sample collection      2. Neuroendocrine tumor of pancreas  - Ambulatory referral/consult to Genetics:  Completed  - Genetic Misc Sendout Test, Blood; Future       Questions were encouraged and answered to the patient's satisfaction, and he verbalized understanding of the information and agreement with the plan.           Approximately 31 minutes were spent face-to-face with the patient.  Approximately 53 minutes in total were spent on this encounter, which includes face-to-face time and non-face-to-face time preparing to see the patient (e.g., review of tests), obtaining and/or reviewing separately obtained history, documenting clinical information in the electronic or other health record, independently interpreting results (not separately reported) and communicating results to the patient/family/caregiver, or care coordination (not separately reported).         Jay Leblanc, DNP, APRN, FNP-BC, AOCNP, CGRA  Nurse Practitioner, Cancer Genetics  Department of Hematology and Oncology  [facility]  [facility]        CC:  Dr. Andre Restrepo        Routed to Cancer Genetics Staff:  Please contact pt to schedule in-person, 30-min post-test visit with me for around 2/28/24 (or whenever best works for him).        Portions of the record may have been created with voice-recognition software. Occasional wrong-word or sound-a-like substitutions may have occurred due to the inherent limitations of voice-recognition software. Read the  chart carefully and recognize, using context, where substitutions have occurred.

## 2024-02-19 ENCOUNTER — TUMOR BOARD CONFERENCE (OUTPATIENT)
Dept: HEMATOLOGY/ONCOLOGY | Facility: CLINIC | Age: 49
End: 2024-02-19

## 2024-02-19 NOTE — PROGRESS NOTES
[facility]      NEUROENDOCRINE MULTIDISCIPLINARY TUMOR BOARD  _____________________________________________________________________    PRESENTER:   Andre Restrepo MD      REASON FOR PRESENTATION:  Scan Review    ATTENDEES:   Gastroenterology - Not Present  Interventional Radiology - Lawrence Biggs MD and MARIA ESTHER Henriquez  Nuclear Medicine - Brett Rivers MD  Nursing - [facility] Nursing: Alison Soliman, RN, Yoli Dodson, RN, and Michelle Campos RN  Oncology - Mari Hylton MD, Lucas Davidson MD, and Justin Dumont MD  Palliative Medicine - Not Present  Pathology -  Sarahi Oliva MD  Research - Not Present  Surgery - MD Edgard Davis MD Nathan Bolton, MD Russell Brown, MD    PATIENT STATUS:  Established Patient    PATIENT SUMMARY:  Past Medical History:   Diagnosis Date    Gallstones        Past Surgical History:   Procedure Laterality Date    CHOLECYSTECTOMY  2010       TUMOR MARKERS:  5-HIAA, Plasma Ref Range: up to 22 ng/mL      Chromogranin A Ref Range: <93 ng/mL      Gastrin Ref Range: <100 pg/mL  Recent Labs   Lab 11/28/23  1445   Gastrin 11     Neurokinin A Ref Range: 0 - 40 pg/mL      Pancreastatin Ref Range: 10 - 135 pg/mL      Pancreatic Polypeptide Ref Range: >314 pg/mL  Recent Labs   Lab 11/28/23  1445   Pancreatic Polypeptide 234     Serotonin Ref Range: <=230 ng/mL            Latest Ref Rng & Units 11/28/2023     2:45 PM 11/28/2023    12:59 PM 7/10/2023     7:40 PM   Neuroendocrine Labs   GASTRIN <100 pg/mL 11  C       CORRECTED RESULT; previously reported as 925 on 11/30/2023 at 10:19.     GLUCAGON <=80 pg/mL 1511        -------------------ADDITIONAL INFORMATION-------------------  Proven glucagonomas have analyte concentrations 10 fold or   more above the reference range.  This test was developed and its performance characteristics   determined by [facility] in a manner consistent with CLIA   requirements. This test has not been cleared or approved by   the U.S. Food and  Drug Administration.    Test Performed by:  [facility] - [place] [id number]  : Garland Cui M.D. Ph.D.; CLIA# [id number]       PANCREATIC POLYPEPTIDE <270 pg/mL 234        -------------------ADDITIONAL INFORMATION-------------------  This test was developed and its performance characteristics   determined by [facility] in a manner consistent with CLIA   requirements. This test has not been cleared or approved by   the U.S. Food and Drug Administration.    Test Performed by:  [facility] - [address] [address]  : Garland Cui M.D. Ph.D.; CLIA# [id number]       WBC 3.90 - 12.70 K/uL   8.99    RBC 4.60 - 6.20 M/uL   4.63    HGB 14.0 - 18.0 g/dL   14.3    HCT 40.0 - 54.0 %   43.8    MCV 82 - 98 fL   95    MCH 27.0 - 31.0 pg   30.9    MCHC 32.0 - 36.0 g/dL   32.6    RDW 11.5 - 14.5 %   13.6    PLATLETS 150 - 450 K/uL   301    MPV 9.2 - 12.9 fL   9.5    GRAN # 1.8 - 7.7 K/uL   6.8    LYMPH # 1.0 - 4.8 K/uL   1.5    MONO # 0.3 - 1.0 K/uL   0.5    EOS # 0.0 - 0.5 K/uL   0.1    BASO # 0.00 - 0.20 K/uL   0.03    GRAN % 38.0 - 73.0 %   76.0    LYMPH % 18.0 - 48.0 %   16.9    MONO % 4.0 - 15.0 %   5.8    EOS % 0.0 - 8.0 %   0.7    BASO % 0.0 - 1.9 %   0.3    DIFF METHOD    Automated    GLUCOSE 70 - 110 mg/dL 83   104    BUN 6 - 20 mg/dL 9   10    CREATININE 0.5 - 1.4 mg/dL 0.7   0.7     - 145 mmol/L 142   139    K 3.5 - 5.1 mmol/L 4.3   3.5    CHLORIDE 95 - 110 mmol/L 105   105    CO2 23 - 29 mmol/L 26   27    CALCIUM 8.7 - 10.5 mg/dL 9.9   9.3    PROTEIN, TOTAL 6.0 - 8.4 g/dL 7.2   7.8    ALBUMIN 3.5 - 5.2 g/dL 3.6   4.4    TOTAL BILIRUBIN 0.1 - 1.0 mg/dL 0.3        For infants and newborns, interpretation of results should be based  on gestational age, weight and in agreement with clinical  observations.    Premature Infant recommended reference ranges:  Up to  24 hours.............<8.0 mg/dL  Up to 48 hours............<12.0 mg/dL  3-5 days..................<15.0 mg/dL  6-29 days.................<15.0 mg/dL    0.7        For infants and newborns, interpretation of results should be based  on gestational age, weight and in agreement with clinical  observations.    Premature Infant recommended reference ranges:  Up to 24 hours.............<8.0 mg/dL  Up to 48 hours............<12.0 mg/dL  3-5 days..................<15.0 mg/dL  6-29 days.................<15.0 mg/dL     ALK PHOSPHATASE 55 - 135 U/L 226   155    SGOT (AST) 10 - 40 U/L 55   74    SGPT (ALT) 10 - 44 U/L 141   165    HEMOGLOBIN A1C 4.0 - 5.6 % 5.1        ADA Screening Guidelines:  5.7-6.4%  Consistent with prediabetes  >or=6.5%  Consistent with diabetes    High levels of fetal hemoglobin interfere with the HbA1C  assay. Heterozygous hemoglobin variants (HbS, HgC, etc)do  not significantly interfere with this assay.   However, presence of multiple variants may affect accuracy.       Weight   245 lbs 13 oz       C Corrected result     ____________________________________________________________________    DISCUSSION: 48 y.o. male pancreatic tail mass,  PNET with metastatic disease, image review    INT RAD: Allowing for differences in modalities, overall disease distribution and size of disease are similar to prior. There are liver metastasis, RP lymph node metastasis and pancreatic tail mass. MRI demonstrates a few subtle subcm enhancing lesions and given small size and prior CT rather than MRI, it's hard to determine whether these were present on prior or not.    NUC MED: As per Dr Figueroa.    BOARD RECOMMENDATIONS: CT every 3-6 months.

## 2024-02-27 ENCOUNTER — TELEPHONE (OUTPATIENT)
Dept: HEMATOLOGY/ONCOLOGY | Facility: CLINIC | Age: 49
End: 2024-02-27

## 2024-02-28 ENCOUNTER — OFFICE VISIT (OUTPATIENT)
Dept: SURGERY | Facility: CLINIC | Age: 49
End: 2024-02-28
Payer: COMMERCIAL

## 2024-02-28 ENCOUNTER — PATIENT MESSAGE (OUTPATIENT)
Dept: HEMATOLOGY/ONCOLOGY | Facility: CLINIC | Age: 49
End: 2024-02-28

## 2024-02-28 ENCOUNTER — TELEPHONE (OUTPATIENT)
Dept: HEMATOLOGY/ONCOLOGY | Facility: CLINIC | Age: 49
End: 2024-02-28

## 2024-02-28 ENCOUNTER — OFFICE VISIT (OUTPATIENT)
Dept: HEMATOLOGY/ONCOLOGY | Facility: CLINIC | Age: 49
End: 2024-02-28
Payer: COMMERCIAL

## 2024-02-28 VITALS
OXYGEN SATURATION: 99 % | DIASTOLIC BLOOD PRESSURE: 95 MMHG | WEIGHT: 245.56 LBS | HEART RATE: 75 BPM | SYSTOLIC BLOOD PRESSURE: 155 MMHG | HEIGHT: 74 IN | BODY MASS INDEX: 31.51 KG/M2

## 2024-02-28 DIAGNOSIS — C25.4: Primary | ICD-10-CM

## 2024-02-28 DIAGNOSIS — Z71.83 ENCOUNTER FOR NONPROCREATIVE GENETIC COUNSELING: Primary | ICD-10-CM

## 2024-02-28 DIAGNOSIS — D3A.8 NEUROENDOCRINE TUMOR OF PANCREAS: ICD-10-CM

## 2024-02-28 PROCEDURE — 99213 OFFICE O/P EST LOW 20 MIN: CPT | Mod: S$PBB,,, | Performed by: NURSE PRACTITIONER

## 2024-02-28 PROCEDURE — 99215 OFFICE O/P EST HI 40 MIN: CPT | Mod: S$GLB,,, | Performed by: SURGERY

## 2024-02-28 PROCEDURE — 99999 PR PBB SHADOW E&M-EST. PATIENT-LVL II: CPT | Mod: PBBFAC,,, | Performed by: NURSE PRACTITIONER

## 2024-02-28 PROCEDURE — 99999 PR PBB SHADOW E&M-EST. PATIENT-LVL III: CPT | Mod: PBBFAC,,, | Performed by: SURGERY

## 2024-02-28 NOTE — TELEPHONE ENCOUNTER
Called patient back and explained to be here for the visit today at 2 pm. ----- Message from Angie Clark sent at 2/28/2024 12:27 PM CST -----  Regarding: results  Contact: Pt  [phone]          Caller: Brain     Returning call to: Meseret     Caller can be reached at:  [phone]     Nature of the call: Requesting a call back states he received two voicemail one saying results were in and one saying results were delayed pt is currently at facility

## 2024-02-28 NOTE — PROGRESS NOTES
"Cancer Genetics  Hereditary and High-Risk Clinic  Department of Hematology and Oncology  [facility]    Date of Service:  24  Visit Provider:  Jay Leblanc DNP  Collaborating Physician:  Hallie Tabares MD    Patient ID  Name: Jamie Phelps    : 1975    MRN: [id number]      SUBJECTIVE      Chief Complaint: Results    History of Present Illness (HPI):  Jamie Phelps ("Jamie"), 49 y.o., assigned male sex at birth, initially presented on 2024 for cancer-genetic risk assessment (upon referral by Dr. Andre Restrepo) and subsequently underwent hereditary cancer genetic testing.  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  He returns today for post-test genetic counseling.    Focused Medical History  Genetic testing:  No  Pancreatic neuroendocrine tumor (NET), dx.age 48  07/10/2023 (age 48):  CT AP with pancreatic tail-level mass  2023 (age 48):  EUS-guided biopsy of mass of pancreas tail, with pathology indicating well differentiated neuroendocrine tumor (NET)  2023 (age 48):  PET CT with 1.4-cm left periaortic node and a few 5-mm peripancreatic and mesenteric nodes, compatible with moni disease  2024 (age 48):  MRI abdomen with findings suggestive of liver metastasis  Has received "2 shots" of systemic therapy thus far  Prior history:  Never diagnosed with pancreatitis; however, started with GI symptoms in   Colon polyp:  No - History of 1 colonoscopy, per patient  Other cancer, tumor, or pertinent mass/lesion:  No  Blood disorder:  No          Past Medical History:   Diagnosis Date    Gallstones             Patient Active Problem List     Diagnosis Date Noted    Primary pancreatic neuroendocrine tumor 2023    Atrial fibrillation 2023      Ancestry  Ashkenazi Sikh:  None known     Family " "Oncologic History  Consanguinity:  No  Hereditary cancer genetic testing in blood relatives:  No  Other than noted, no known history of cancer in relatives depicted in the pedigree or in:  maternal first cousins/first half-cousins, maternal extended family, paternal extended family.  Other than noted, no known family history of pancreatitis, benign tumor/mass/lesion, or colon polyps.  Unaware of or limited knowledge of medical history of:  paternal first cousins.     ONCOLOGY PEDIGREE  ** If this pedigree appears small/illegible on your screen, expand this note window horizontally. **      Review of Systems  See HPI.    Patient's Distress Score today was 8/10 (scale of 0-10 on which 10=worst).  Patient attributes this to health, money.  Patient denies experiencing suicidal or homicidal ideations (SI/HI).  Offered patient a referral to Behavioral Health, and patient declined.      OBJECTIVE     Physical Exam  Very pleasant patient.  Accompanied by his wife, Yuly, who is also very pleasant.  Vitals signs:  Reviewed:  Vitals:    02/28/24 1344   PainSc: 0-No pain       (Pain is rated on scale of 0-10 on which 10=worst.)  Constitutional      Appearance:  Appears well developed and well nourished. No distress.   Pulmonary     Effort:  Normal.  Neurological     Mental Status:  Alert and oriented.     Coordination:  Normal.   Psychiatric         Mood and Affect:  Normal.     Thought Content:  Normal.     Speech:  Normal.     Behavior:  Normal.     Judgment:  Normal.    Gene Variant Review    Genetic Variant Germline Classification per ClinVar as of 02/29/2024  Reference   [facility] c.719G>A Uncertain significance National Center for [employer] Information. ClinVar; [[id number]], https://www.ncbi.nlm.nih.gov/clinvar/variation/XNF476882269.17 (accessed Feb. 29, 2024).      ASSESSMENT / PLAN      Jamie Phelps ("Jamie"), 49 y.o., assigned male sex at birth, initially presented on 01/31/2024 for cancer-genetic risk " assessment (upon referral by Dr. Andre Restrepo) and subsequently underwent hereditary cancer genetic testing.  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  He returns today for post-test genetic counseling.    The below information has been discussed with and/or provided in writing to Jamie.       Diagnoses and Orders for This Encounter:    ICD-10-CM ICD-9-CM   1. Encounter for nonprocreative genetic counseling  Z71.83 V26.33   2. Neuroendocrine tumor of pancreas  D3A.8 209.69     1. Encounter for nonprocreative genetic counseling    Germline (Hereditary) Cancer Susceptibility Gene Testing  This is a partial copy of the report.  The complete report can be found in the patient's medical record.        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  A VUS is a genetic change that may be benign or may be harmful, but at this time there is not enough data to categorize it as either.  VUSs are not typically considered to be clinically actionable.  Historically most VUSs that are reclassified are downgraded to benign or likely benign.  If [facility] reclassifies your VUS in the future, notification should be provided; additionally, I encourage you to call [facility] every 6-12 months at phone number [phone] to check the classification status(es) of your VUS(s) at that time, and please let me know if any updates are ever provided when you do call [facility].    While you are not known at this time to carry any pathogenic (harmful) variants (also known as mutations) in any of the following genes, the below is provided for your reference:  Pathogenic variants (mutations) in the CTRC gene can be associated with:  Autosomal-dominantly inherited predisposition* to:  Chronic  pancreatitis  * Terms and definitions:  Autosomal-dominantly inherited predisposition:  Carrying only one mutated copy of the gene (not two mutated copies) is sufficient for the predisposition to apply.  Autosomal-recessively inherited risk:  Carrying only one mutated copy of the gene is not sufficient for the condition to develop; rather, both copies of the gene must be mutated.    With regard to your own diagnosis of pancreatic neuroendocrine tumor, the negative (normal) results in the associated genes represent a true-negative (or an informative) result, meaning that your pancreatic neuroendocrine tumor was more likely to have been sporadic than hereditary; however, these genetic testing results reduce--but do not eliminate--the possibility of previously diagnosed cancers of yours having been hereditary or the possibility of your developing a hereditary cancer in the future.    Your negative results for genes associated with cancers/other conditions in your relatives, with which you have not personally been affected, are uninformative.  In other words, your affected relatives' cancers/other conditions may have been hereditary or may have been sporadic, and without knowing that information, your negative results in the associated genes only tell us that you likely don't have a hereditary predisposition to those cancers/other conditions; however, you can still develop those cancers/other conditions and in fact may even have an increased risk for them based in part on your family history.  If the appropriate, affected relatives are found to carry a pathogenic (harmful) variant that explains their cancers/other conditions, and you do not carry that variant, your negative results would then be considered a true-negative.    Health Maintenance / Cancer Risks and Risk Management    Only a small percentage (approximately 5%-10%) of cancers are hereditary, meaning caused by an inherited gene mutation; rather, most cancers  are sporadic.  Environmental factors, lifestyle factors, and even factors beyond our control play a significant role in the development of many cancers.  As such, an individual can develop cancer even with negative genetic testing.  Furthermore, even with negative genetic testing, you can still be at increased risk for certain cancers, as you may independently have risk factors for those cancers and/or you may share risk factors with your family members affected by cancer.  For these reasons, it is strongly recommended that you ensure that your healthcare providers are aware of and up-to-date on your personal and family history so that your medical management including cancer screenings can be based in part off of this information.      The following cancer screenings are currently recommended for you (please note that these recommendations can change based on updates to clinical guidelines and/or with changes to your medical or family history and are therefore only considered accurate as of the date on which this letter was composed; additional and/or alternative screenings may be recommended by your healthcare providers, and recommendations from your healthcare providers directly managing these risks supersede the below recommendations):    Cancer in general:  Annual visit with your primary care provider (PCP) for history review and physical exam and age-appropriate testing.    Colorectal cancer:  Colorectal cancer screenings as recommended by your PCP or, if you have one, your gastroenterology (GI) provider, along with your oncologist.  With regard to family history, please let me know if any of the following applies, as such could change colorectal cancer screening recommendations for you:  If you learn moving forward that any blood relative of yours has been diagnosed with colorectal cancer.  If a first-degree relative (i.e., your parent, sibling, or child) has a colorectal polyp history including any of the  following characteristics:  Adenoma with high-grade dysplasia  Adenoma or sessile serrated polyp/adenoma 1 cm or larger in size  Villous or tubulovillous adenoma  Traditional serrated adenoma (TSA)  Sessile serrated lesion/polyp/adenoma with any dysplasia  Cumulatively 10 or more polyps    Prostate cancer:  Please follow up with your PCP or, if you have one, your urology provider about when your next PSA test (with or without DEYSI) is due, and also discuss such with your oncologist.  Generally what would be offered based on your current age is serum prostate-specific antigen (PSA) measurement (blood work), with consideration of digital rectal exam (DEYSI) of the prostate as well.  The frequency with which these tests are performed depends on prior exam findings.      Skin cancer:  Annual total-body skin examination (TBSE) with a dermatology provider.    If you are not established with a healthcare specialist listed above, please let me know so I can refer you.    Some information regarding general cancer risk reduction:  It is recommended to avoid tobacco use; be physically active; maintain a healthy weight; eat a diet rich in fruits, vegetables, and whole grains and low in saturated/trans fat, red meat, and processed meat; limit alcohol consumption (zero is best); protect against sexually transmitted infections; protect against the sun (by minimizing ultraviolet (UV) exposure, wearing UV-protective clothing, and using high-SPF sunblock), and avoid tanning beds; and get regular screenings for cancers as recommended by your healthcare team.    Regarding Your Relatives    Your relatives also may be at increased risk for certain cancers, depending upon their own personal and family history; therefore, it is important that they, too, ensure that their own healthcare providers are aware of and up-to-date on their personal and family history so that their medical management including cancer screenings can be based in part  off of this information.      Additionally, it is possible for your relatives to have hereditary cancer susceptibility gene mutations even when you have negative genetic testing.      Your relatives should speak with a healthcare provider about their cancer risks and whether genetic counseling and potentially testing may be indicated for them.  Anyone interested in pursuing cancer genetic counseling/testing may contact the [facility] at [phone] to schedule an appointment or may visit [email or web address] to locate a genetic specialist near them.    Cancer Genetics Follow-up    Moving forward, please update me with any changes to--or new information learned about--your personal or family history and with any relative's genetic testing results.  Barring any such changes, please follow up with me in 3 years and as you need or desire sooner.    In the meantime, please don't hesitate to reach out with any questions or concerns.    2. Neuroendocrine tumor of pancreas  - Jamie is to continue surveillance and management as recommended by his oncologist.     Follow-up:  Follow up in about 3 years (around 2/28/2027).  Follow up sooner as needed/desired.    Questions were encouraged and answered to the patient's satisfaction, and he verbalized understanding of the information and agreement with the plan.           Approximately 14 minutes were spent face-to-face with the patient.  Approximately 22 minutes in total were spent on this encounter, which includes face-to-face time and non-face-to-face time preparing to see the patient (e.g., review of tests), obtaining and/or reviewing separately obtained history, documenting clinical information in the electronic or other health record, independently interpreting results (not separately reported) and communicating results to the patient/family/caregiver, or care coordination (not separately reported).         Jay Leblanc, DNP, APRN, FNP-BC, AOCNP, CGRA  Nurse Practitioner,  Hereditary and High-Risk Clinic  Department of Hematology and Oncology  [facility]    [facility]        CC:  Dr. Andre Restrepo         Routed to Cancer Genetics Staff:  Please place 3-yr recall.

## 2024-02-29 ENCOUNTER — PATIENT MESSAGE (OUTPATIENT)
Dept: HEMATOLOGY/ONCOLOGY | Facility: CLINIC | Age: 49
End: 2024-02-29

## 2024-02-29 NOTE — PATIENT INSTRUCTIONS
"Jamie Phelps ("Jamie"), 49 y.o., assigned male sex at birth, initially presented on 01/31/2024 for cancer-genetic risk assessment (upon referral by Dr. Andre Restrepo) and subsequently underwent hereditary cancer genetic testing.  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  He returns today for post-test genetic counseling.    The below information has been discussed with and/or provided in writing to Jamie.       Diagnoses and Orders for This Encounter:    ICD-10-CM ICD-9-CM   1. Encounter for nonprocreative genetic counseling  Z71.83 V26.33   2. Neuroendocrine tumor of pancreas  D3A.8 209.69     1. Encounter for nonprocreative genetic counseling    Germline (Hereditary) Cancer Susceptibility Gene Testing  This is a partial copy of the report.  The complete report can be found in the patient's medical record.        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  A VUS is a genetic change that may be benign or may be harmful, but at this time there is not enough data to categorize it as either.  VUSs are not typically considered to be clinically actionable.  Historically most VUSs that are reclassified are downgraded to benign or likely benign.  If [facility] reclassifies your VUS in the future, notification should be provided; additionally, I encourage you to call [facility] every 6-12 months at phone number [phone] to check the classification status(es) of your VUS(s) at that time, and please let me know if any updates are ever provided when you do call [facility].    While you are not known at this time to carry any pathogenic (harmful) variants (also known as mutations) in any of the following genes, the below is provided for your reference:  Pathogenic variants " (mutations) in the CTRC gene can be associated with:  Autosomal-dominantly inherited predisposition* to:  Chronic pancreatitis  * Terms and definitions:  Autosomal-dominantly inherited predisposition:  Carrying only one mutated copy of the gene (not two mutated copies) is sufficient for the predisposition to apply.  Autosomal-recessively inherited risk:  Carrying only one mutated copy of the gene is not sufficient for the condition to develop; rather, both copies of the gene must be mutated.    With regard to your own diagnosis of pancreatic neuroendocrine tumor, the negative (normal) results in the associated genes represent a true-negative (or an informative) result, meaning that your pancreatic neuroendocrine tumor was more likely to have been sporadic than hereditary; however, these genetic testing results reduce--but do not eliminate--the possibility of previously diagnosed cancers of yours having been hereditary or the possibility of your developing a hereditary cancer in the future.    Your negative results for genes associated with cancers/other conditions in your relatives, with which you have not personally been affected, are uninformative.  In other words, your affected relatives' cancers/other conditions may have been hereditary or may have been sporadic, and without knowing that information, your negative results in the associated genes only tell us that you likely don't have a hereditary predisposition to those cancers/other conditions; however, you can still develop those cancers/other conditions and in fact may even have an increased risk for them based in part on your family history.  If the appropriate, affected relatives are found to carry a pathogenic (harmful) variant that explains their cancers/other conditions, and you do not carry that variant, your negative results would then be considered a true-negative.    Health Maintenance / Cancer Risks and Risk Management    Only a small percentage  (approximately 5%-10%) of cancers are hereditary, meaning caused by an inherited gene mutation; rather, most cancers are sporadic.  Environmental factors, lifestyle factors, and even factors beyond our control play a significant role in the development of many cancers.  As such, an individual can develop cancer even with negative genetic testing.  Furthermore, even with negative genetic testing, you can still be at increased risk for certain cancers, as you may independently have risk factors for those cancers and/or you may share risk factors with your family members affected by cancer.  For these reasons, it is strongly recommended that you ensure that your healthcare providers are aware of and up-to-date on your personal and family history so that your medical management including cancer screenings can be based in part off of this information.      The following cancer screenings are currently recommended for you (please note that these recommendations can change based on updates to clinical guidelines and/or with changes to your medical or family history and are therefore only considered accurate as of the date on which this letter was composed; additional and/or alternative screenings may be recommended by your healthcare providers, and recommendations from your healthcare providers directly managing these risks supersede the below recommendations):    Cancer in general:  Annual visit with your primary care provider (PCP) for history review and physical exam and age-appropriate testing.    Colorectal cancer:  Colorectal cancer screenings as recommended by your PCP or, if you have one, your gastroenterology (GI) provider, along with your oncologist.  With regard to family history, please let me know if any of the following applies, as such could change colorectal cancer screening recommendations for you:  If you learn moving forward that any blood relative of yours has been diagnosed with colorectal cancer.  If  a first-degree relative (i.e., your parent, sibling, or child) has a colorectal polyp history including any of the following characteristics:  Adenoma with high-grade dysplasia  Adenoma or sessile serrated polyp/adenoma 1 cm or larger in size  Villous or tubulovillous adenoma  Traditional serrated adenoma (TSA)  Sessile serrated lesion/polyp/adenoma with any dysplasia  Cumulatively 10 or more polyps    Prostate cancer:  Please follow up with your PCP or, if you have one, your urology provider about when your next PSA test (with or without DEYSI) is due, and also discuss such with your oncologist.  Generally what would be offered based on your current age is serum prostate-specific antigen (PSA) measurement (blood work), with consideration of digital rectal exam (DEYSI) of the prostate as well.  The frequency with which these tests are performed depends on prior exam findings.      Skin cancer:  Annual total-body skin examination (TBSE) with a dermatology provider.    If you are not established with a healthcare specialist listed above, please let me know so I can refer you.    Some information regarding general cancer risk reduction:  It is recommended to avoid tobacco use; be physically active; maintain a healthy weight; eat a diet rich in fruits, vegetables, and whole grains and low in saturated/trans fat, red meat, and processed meat; limit alcohol consumption (zero is best); protect against sexually transmitted infections; protect against the sun (by minimizing ultraviolet (UV) exposure, wearing UV-protective clothing, and using high-SPF sunblock), and avoid tanning beds; and get regular screenings for cancers as recommended by your healthcare team.    Regarding Your Relatives    Your relatives also may be at increased risk for certain cancers, depending upon their own personal and family history; therefore, it is important that they, too, ensure that their own healthcare providers are aware of and up-to-date on  their personal and family history so that their medical management including cancer screenings can be based in part off of this information.      Additionally, it is possible for your relatives to have hereditary cancer susceptibility gene mutations even when you have negative genetic testing.      Your relatives should speak with a healthcare provider about their cancer risks and whether genetic counseling and potentially testing may be indicated for them.  Anyone interested in pursuing cancer genetic counseling/testing may contact the [facility] at [phone] to schedule an appointment or may visit [email or web address] to locate a genetic specialist near them.    Cancer Genetics Follow-up    Moving forward, please update me with any changes to--or new information learned about--your personal or family history and with any relative's genetic testing results.  Barring any such changes, please follow up with me in 3 years and as you need or desire sooner.    In the meantime, please don't hesitate to reach out with any questions or concerns.    2. Neuroendocrine tumor of pancreas  - Jamie is to continue surveillance and management as recommended by his oncologist.     Follow-up:  Follow up in about 3 years (around 2/28/2027).  Follow up sooner as needed/desired.

## 2024-03-07 ENCOUNTER — TELEPHONE (OUTPATIENT)
Dept: SURGERY | Facility: CLINIC | Age: 49
End: 2024-03-07

## 2024-03-07 PROBLEM — C25.4: Status: ACTIVE | Noted: 2023-11-28

## 2024-03-07 NOTE — PROGRESS NOTES
"  Encounter Date:  2024    Patient ID: Jamie Phelps  Age:  49 y.o. :  1975    Chief Complaint:  followup of PNET     Interval History:  Mr. Phelps is doing well, he had an episode of pancreatitis around one of the octreotide injections.  He's here for genetics consult, feels well, continues to work.  Here with his wife.    Past Medical History:   Diagnosis Date    Gallstones      Past Surgical History:   Procedure Laterality Date    CHOLECYSTECTOMY       Current Outpatient Medications on File Prior to Visit   Medication Sig Dispense Refill    losartan (COZAAR) 12.5 MG tablet 25 mg.      metoprolol succinate (TOPROL-XL) 100 MG 24 hr tablet Take 100 mg by mouth.      XARELTO 20 mg Tab Take 20 mg by mouth.      famotidine (PEPCID) 10 MG tablet Take 10 mg by mouth. PRN       No current facility-administered medications on file prior to visit.     Review of patient's allergies indicates:   Allergen Reactions    Lisinopril Anaphylaxis, Shortness Of Breath and Swelling       Family History:  His family history includes Cancer in an other family member; Colonic polyp (age of onset: 65) in his mother; Crohn's disease in his mother; Diverticulitis in his mother; Graves' disease in his maternal aunt; Lung cancer in his maternal grandfather; Melanoma in his father and paternal uncle; Throat cancer in an other family member; Thyroid disease in his maternal grandmother and mother.     Social History:  He reports that he has quit smoking. His smoking use included cigarettes. He has never used smokeless tobacco.     ROS:     Review of Systems  Pertinent positive/negatives detailed in HPI, all other systems negative.     Physical Exam:  BP (!) 155/95   Pulse 75   Ht 6' 2" (1.88 m)   Wt 111.4 kg (245 lb 9.5 oz)   SpO2 99%   BMI 31.53 kg/m²     Physical Exam  Constitutional:  Non-toxic, no acute distress.  Performance status:  ECO  Eyes:  Sclerae anicteric, gaze symmetrical  Neck:  Trachea midline, voice " normal, FROM  Resp:  Easy work of breathing  Musculoskeletal:  Ambulatory, normal gait, no muscle wasting.   Neuro:  No gross deficits, moving all extremities  Psych:  Awake, alert, oriented.  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  -biochemical w/u consistent with glucagonoma    2/28/2024 Reviewed options again with he and wife.  Offered exploration with resection of his PNET and all gross disease in hopes of decreasing pancreatitis risk (he has had several episodes localized to tail) and getting him LILIAN.  We talked about the conduct of that operation which would involve on table risk assessments based on operative findings/abiltiy to locate these small tumors    Plan:  He's thinking over things, will stay on octreotide and call with plans.  He has had pre-splenectomy vaccinations.          Questions were asked and answered to patient's satisfaction.  We discussed the need for continued clinical/radiographic/endoscopic follow-up.    I spent >40 minutes of total time on the encounter, which includes face to face time and non-face to face time preparing to see the patient (e.g., review of tests), obtaining and/or reviewing separately obtained history, documenting clinical information in the electronic or other health record, independently interpreting results (not separately reported) and communicating results to the patient/family/caregiver, or care coordination (not separately reported).        Andre Restrepo MD, FACS  Surgical Oncology  [facility] [place], [place]  Office: [phone]  Fax: [phone]     Jamie Phelps 1975

## 2024-08-14 ENCOUNTER — TELEPHONE (OUTPATIENT)
Dept: HEMATOLOGY/ONCOLOGY | Facility: CLINIC | Age: 49
End: 2024-08-14